# Patient Record
Sex: MALE | Race: WHITE | URBAN - METROPOLITAN AREA
[De-identification: names, ages, dates, MRNs, and addresses within clinical notes are randomized per-mention and may not be internally consistent; named-entity substitution may affect disease eponyms.]

---

## 2017-08-01 ENCOUNTER — EMERGENCY (EMERGENCY)
Facility: HOSPITAL | Age: 37
LOS: 1 days | End: 2017-08-01
Payer: MEDICAID

## 2017-08-01 PROCEDURE — 99284 EMERGENCY DEPT VISIT MOD MDM: CPT

## 2019-06-24 ENCOUNTER — EMERGENCY (EMERGENCY)
Facility: HOSPITAL | Age: 39
LOS: 0 days | Discharge: HOME | End: 2019-06-24
Attending: EMERGENCY MEDICINE | Admitting: EMERGENCY MEDICINE
Payer: MEDICAID

## 2019-06-24 VITALS
DIASTOLIC BLOOD PRESSURE: 83 MMHG | SYSTOLIC BLOOD PRESSURE: 126 MMHG | OXYGEN SATURATION: 98 % | TEMPERATURE: 98 F | HEART RATE: 83 BPM | RESPIRATION RATE: 18 BRPM

## 2019-06-24 VITALS — HEIGHT: 66 IN | WEIGHT: 255.07 LBS

## 2019-06-24 DIAGNOSIS — J45.901 UNSPECIFIED ASTHMA WITH (ACUTE) EXACERBATION: ICD-10-CM

## 2019-06-24 DIAGNOSIS — R07.89 OTHER CHEST PAIN: ICD-10-CM

## 2019-06-24 PROCEDURE — 93010 ELECTROCARDIOGRAM REPORT: CPT

## 2019-06-24 PROCEDURE — 99283 EMERGENCY DEPT VISIT LOW MDM: CPT | Mod: 25

## 2019-06-24 RX ORDER — IPRATROPIUM/ALBUTEROL SULFATE 18-103MCG
3 AEROSOL WITH ADAPTER (GRAM) INHALATION
Refills: 0 | Status: COMPLETED | OUTPATIENT
Start: 2019-06-24 | End: 2019-06-24

## 2019-06-24 RX ORDER — ALBUTEROL 90 UG/1
2 AEROSOL, METERED ORAL
Qty: 1 | Refills: 0
Start: 2019-06-24

## 2019-06-24 RX ADMIN — Medication 3 MILLILITER(S): at 12:09

## 2019-06-24 RX ADMIN — Medication 50 MILLIGRAM(S): at 11:48

## 2019-06-24 NOTE — ED STATDOCS - OBJECTIVE STATEMENT
37 y/o male with a PMHx of Asthma presents to the ED c/o episode of SOB this morning. Pt states recently he has noticed that his asthma has been worse. Pt states he had an asthma attack earlier today. States he now feels better but feels chest pressure. Former smoker, quit 2 months ago. Allergies: Pollen

## 2019-06-24 NOTE — ED STATDOCS - PROGRESS NOTE DETAILS
Patient seen and evaluated s/p 1 neb.  He declines the additonal 2, he is feeling better.  Lungs are CTAB.  REviewed return precautions -Jaun Hutchinson PA-C

## 2019-06-24 NOTE — ED ADULT NURSE NOTE - CHIEF COMPLAINT QUOTE
c/o asthma exacerbation for past 2 days, c/o chest pressure which he normally feels when having asthma attacks as per patient, pt took albuterol inhaler today with positive results, O2 sat in triage 97% on RA, pulse 95

## 2019-06-24 NOTE — ED ADULT NURSE NOTE - OBJECTIVE STATEMENT
Pt to ED for asthma exacerbation, pt did not have rescue inhaler at home. Pt is nondistressed, able to speak without SOB.

## 2019-06-24 NOTE — ED STATDOCS - CLINICAL SUMMARY MEDICAL DECISION MAKING FREE TEXT BOX
Plan for neb treatment, steroids and reassess for asthma exacerbation. Plan for neb treatment, steroids and reassess for asthma exacerbation.    He has improved.  He will follow up PMD

## 2021-02-12 NOTE — ED ADULT TRIAGE NOTE - CHIEF COMPLAINT QUOTE
REC Voluma x 2. c/o asthma exacerbation for past 2 days, c/o chest pressure which he normally feels when having asthma attacks as per patient, pt took albuterol inhaler today with positive results, O2 sat in triage 97% on RA, pulse 95

## 2022-03-29 NOTE — ED ADULT NURSE NOTE - NS ED NOTE ABUSE RESPONSE YN
Yes
Detail Level: Simple
Price (Do Not Change): 0.00
Instructions: This plan will send the code FBSD to the PM system.  DO NOT or CHANGE the price.

## 2024-04-08 NOTE — ED ADULT NURSE NOTE - NSSUSCREENINGQ2_ED_ALL_ED
Mild nonspecific inflammation seen on stomach and small bowel biopsy.  Recommend protonix 20 mg bid x 6 weeks    The polyp(s) biopsies showed adenomatous change. This is not cancerous but is considered potentially precancerous. Follow-up colonoscopy in 5 years is advised.     
No